# Patient Record
Sex: FEMALE | Race: WHITE | Employment: UNEMPLOYED | URBAN - METROPOLITAN AREA
[De-identification: names, ages, dates, MRNs, and addresses within clinical notes are randomized per-mention and may not be internally consistent; named-entity substitution may affect disease eponyms.]

---
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
## 2025-02-13 ENCOUNTER — OFFICE VISIT (OUTPATIENT)
Age: 20
End: 2025-02-13

## 2025-02-13 VITALS
HEART RATE: 126 BPM | OXYGEN SATURATION: 99 % | TEMPERATURE: 100.4 F | WEIGHT: 101 LBS | RESPIRATION RATE: 16 BRPM | DIASTOLIC BLOOD PRESSURE: 74 MMHG | SYSTOLIC BLOOD PRESSURE: 113 MMHG

## 2025-02-13 DIAGNOSIS — J10.1 INFLUENZA A: Primary | ICD-10-CM

## 2025-02-13 DIAGNOSIS — R05.9 COUGH, UNSPECIFIED TYPE: ICD-10-CM

## 2025-02-13 DIAGNOSIS — M79.10 MYALGIA: ICD-10-CM

## 2025-02-13 DIAGNOSIS — R50.9 FEVER, UNSPECIFIED FEVER CAUSE: ICD-10-CM

## 2025-02-13 LAB
INFLUENZA A ANTIGEN, POC: POSITIVE
INFLUENZA B ANTIGEN, POC: NEGATIVE
Lab: NORMAL
PERFORMING INSTRUMENT: NORMAL
QC PASS/FAIL: NORMAL
SARS-COV-2, POC: NORMAL

## 2025-02-13 RX ORDER — DESVENLAFAXINE 50 MG/1
50 TABLET, FILM COATED, EXTENDED RELEASE ORAL DAILY
COMMUNITY
End: 2025-02-13

## 2025-02-13 RX ORDER — OSELTAMIVIR PHOSPHATE 75 MG/1
75 CAPSULE ORAL 2 TIMES DAILY
Qty: 10 CAPSULE | Refills: 0 | Status: SHIPPED | OUTPATIENT
Start: 2025-02-13 | End: 2025-02-18

## 2025-02-13 NOTE — PATIENT INSTRUCTIONS
Patient presents with intermittent cough, temp of 100.4, tachycardia.  Multiple sick contacts as works in [employer]. Discussed Tamiflu with patient and she is within the window to take.  She would want to take Tamiflu if test positive so we will test her for the flu as well as COVID.    Is positive for flu A.  Will treat with Tamiflu 75 mg 1 pill twice a day for 5 days

## 2025-02-13 NOTE — PROGRESS NOTES
Chinyere Lipscomb (:  2005) is a 19 y.o. female,New patient, here for evaluation of the following chief complaint(s):  Cold Symptoms (Cough, fever, body aches,sore throat x 2 days )      Assessment & Plan :    Patient presents with intermittent cough, temp of 100.4, tachycardia.  Multiple sick contacts as works in [employer]. Discussed Tamiflu with patient and she is within the window to take.  She would want to take Tamiflu if test positive so we will test her for the flu as well as COVID.    Is positive for flu A.  Will treat with Tamiflu 75 mg 1 pill twice a day for 5 days      Subjective :    Cold Symptoms        19 y.o. female with history of migraines, orthostatic intolerance, malnutrition, hypermobility, depression, and ADHD, presents with sore throat that began yesterday morning and then progressed into runny nose cough with some shortness of breath, headache, fevers later in the day yesterday.  Reports fever as high as 102 last night.  Has taken NyQuil which helped symptoms a small bit.  No abdominal pain, nausea, vomiting, ear pain.  Works in [employer] around lots of people who have been ill lately.           Vitals:    25 0819   BP: 113/74   Site: Right Upper Arm   Position: Sitting   Cuff Size: Medium Adult   Pulse: (!) 126   Resp: 16   Temp: 100.4 °F (38 °C)   SpO2: 99%   Weight: 45.8 kg (101 lb)       No results found for this visit on 25.      Objective   Physical Exam  Constitutional:       Appearance: Normal appearance.   HENT:      Head: Normocephalic.      Right Ear: Tympanic membrane, ear canal and external ear normal.      Left Ear: Tympanic membrane, ear canal and external ear normal.      Nose: Congestion present.      Mouth/Throat:      Mouth: Mucous membranes are moist.      Pharynx: No oropharyngeal exudate or posterior oropharyngeal erythema.   Eyes:      Extraocular Movements: Extraocular movements intact.      Conjunctiva/sclera: Conjunctivae normal.      Pupils: Pupils are

## 2025-03-09 ENCOUNTER — OFFICE VISIT (OUTPATIENT)
Age: 20
End: 2025-03-09

## 2025-03-09 VITALS
DIASTOLIC BLOOD PRESSURE: 68 MMHG | OXYGEN SATURATION: 99 % | SYSTOLIC BLOOD PRESSURE: 107 MMHG | HEART RATE: 92 BPM | TEMPERATURE: 98.7 F | WEIGHT: 99.8 LBS | RESPIRATION RATE: 17 BRPM

## 2025-03-09 DIAGNOSIS — T14.8XXA MUSCLE STRAIN: Primary | ICD-10-CM

## 2025-03-09 NOTE — PROGRESS NOTES
3/9/2025   Chinyere Lipscomb (: 2005) is a 19 y.o. female, New patient, here for evaluation of the following chief complaint(s):  Shoulder Pain (Pt present with left shoulder pain, pain is coming from the lower head onto the shoulder, started 1 day ago. )     ASSESSMENT/PLAN:  Below is the assessment and plan developed based on review of pertinent history, physical exam, labs, studies, and medications.  1. Muscle strain  -     diclofenac (VOLTAREN) 50 MG EC tablet; Take 1 tablet by mouth 2 times daily for 5 days, Disp-10 tablet, R-0Normal    Muscle strain is suspected as tenderness with palpation is noted in left outer trapezius area. No pain noted in left shoulder joint. Patient has full ROM in left shoulder. Ordered diclofenac and advised the following:  -Take diclofenac as prescribed with food.  Do not take OTC NSAIDs (ex. ibuprofen, Aleve, Advil, naproxen, Naprosyn) while taking diclofenac  -Alternate between ice and heat.  Heat may be applied for 30 minutes at a time every 4-5 hours.  Take warm epsom salt baths and gentle stretches.  -Lidocaine patches for 12 hours on and 12 hours off.  -May take tylenol  -Increase water hydration.  -Avoid lifting heavy objects.  Handout given with care instructions  2. Follow up with PCP as needed.  3. Go to ED with development of any acute symptoms.     Follow up:    Follow up immediately for any new, worsening or changes or if symptoms are not improving over the next 5-7 days.     SUBJECTIVE/OBJECTIVE:  19-year-old patient here today with complaint of left upper and shoulder pain that started yesterday.  Patient denies any known trauma.  Reports that she does lift heavy items at work.  Reports that she has taken OTC ibuprofen for symptoms.  Denies any numbness or tingling in left arm or hand.  Denies any decreased range of motion in the left shoulder.        Shoulder Pain (Pt present with left shoulder pain, pain is coming from the lower head onto the shoulder,

## 2025-03-09 NOTE — PATIENT INSTRUCTIONS
Thank you for visiting [facility] today.  -Take diclofenac as prescribed with food.  Do not take OTC NSAIDs (ex. ibuprofen, Aleve, Advil, naproxen, Naprosyn) while taking diclofenac  -Alternate between ice and heat.  Heat may be applied for 30 minutes at a time every 4-5 hours.  Take warm epsom salt baths and gentle stretches.  -Lidocaine patches for 12 hours on and 12 hours off.  -Alternate Ibuprofen and Tylenol  -Increase water hydration.  -Avoid lifting heavy objects.    Please follow up with your primary care provider within 2-3 days if  your signs and symptoms have not resolved or worsened.